# Patient Record
Sex: FEMALE | Race: WHITE | NOT HISPANIC OR LATINO | Employment: UNEMPLOYED | URBAN - METROPOLITAN AREA
[De-identification: names, ages, dates, MRNs, and addresses within clinical notes are randomized per-mention and may not be internally consistent; named-entity substitution may affect disease eponyms.]

---

## 2021-03-15 PROBLEM — A07.1 GIARDIASIS: Status: ACTIVE | Noted: 2021-03-15

## 2021-03-25 PROBLEM — F17.200 TOBACCO USE DISORDER: Status: ACTIVE | Noted: 2021-03-25

## 2021-07-16 PROBLEM — R01.1 SYSTOLIC EJECTION MURMUR: Status: ACTIVE | Noted: 2021-07-16

## 2021-07-16 PROBLEM — I10 ESSENTIAL HYPERTENSION: Status: ACTIVE | Noted: 2021-07-16

## 2021-07-16 PROBLEM — I51.7 LVH (LEFT VENTRICULAR HYPERTROPHY): Status: ACTIVE | Noted: 2021-07-16

## 2021-07-19 PROBLEM — I49.3 PVCS (PREMATURE VENTRICULAR CONTRACTIONS): Status: RESOLVED | Noted: 2021-07-19 | Resolved: 2021-07-19

## 2021-07-19 PROBLEM — I49.3 PVCS (PREMATURE VENTRICULAR CONTRACTIONS): Status: ACTIVE | Noted: 2021-07-19

## 2021-07-19 PROBLEM — E83.42 HYPOMAGNESEMIA: Status: ACTIVE | Noted: 2021-07-19

## 2021-07-19 PROBLEM — E87.6 HYPOKALEMIA: Status: RESOLVED | Noted: 2021-07-19 | Resolved: 2021-07-19

## 2021-07-19 PROBLEM — E87.6 HYPOKALEMIA: Status: ACTIVE | Noted: 2021-07-19

## 2021-07-19 PROBLEM — E83.42 HYPOMAGNESEMIA: Status: RESOLVED | Noted: 2021-07-19 | Resolved: 2021-07-19

## 2022-03-22 ENCOUNTER — OFFICE VISIT (OUTPATIENT)
Dept: URGENT CARE | Facility: CLINIC | Age: 62
End: 2022-03-22
Payer: COMMERCIAL

## 2022-03-22 ENCOUNTER — PHARMACY VISIT (OUTPATIENT)
Dept: PHARMACY | Facility: MEDICAL CENTER | Age: 62
End: 2022-03-22
Payer: COMMERCIAL

## 2022-03-22 VITALS
OXYGEN SATURATION: 96 % | WEIGHT: 117 LBS | BODY MASS INDEX: 22.97 KG/M2 | HEIGHT: 60 IN | TEMPERATURE: 98.1 F | RESPIRATION RATE: 16 BRPM | DIASTOLIC BLOOD PRESSURE: 94 MMHG | SYSTOLIC BLOOD PRESSURE: 130 MMHG | HEART RATE: 85 BPM

## 2022-03-22 DIAGNOSIS — M10.9 ACUTE GOUT INVOLVING TOE OF RIGHT FOOT, UNSPECIFIED CAUSE: ICD-10-CM

## 2022-03-22 PROCEDURE — 99203 OFFICE O/P NEW LOW 30 MIN: CPT | Performed by: STUDENT IN AN ORGANIZED HEALTH CARE EDUCATION/TRAINING PROGRAM

## 2022-03-22 PROCEDURE — RXMED WILLOW AMBULATORY MEDICATION CHARGE: Performed by: STUDENT IN AN ORGANIZED HEALTH CARE EDUCATION/TRAINING PROGRAM

## 2022-03-22 RX ORDER — INDOMETHACIN 50 MG/1
50 CAPSULE ORAL 2 TIMES DAILY PRN
Qty: 30 CAPSULE | Refills: 1 | Status: SHIPPED | OUTPATIENT
Start: 2022-03-22 | End: 2022-03-22

## 2022-03-22 RX ORDER — INDOMETHACIN 50 MG/1
50 CAPSULE ORAL 2 TIMES DAILY PRN
Qty: 30 CAPSULE | Refills: 1 | Status: SHIPPED | OUTPATIENT
Start: 2022-03-22

## 2022-03-22 ASSESSMENT — FIBROSIS 4 INDEX: FIB4 SCORE: 2.21

## 2022-03-22 NOTE — PROGRESS NOTES
Subjective:   CHIEF COMPLAINT  Chief Complaint   Patient presents with   • Gout     Flare up started last night         HPI  Elinor Waddell is a 62 y.o. female who presents with a chief complaint of an acute gout flare of her right big toe.  Patient is requesting a refill of her indomethacin which is the only medication that helps.  Symptoms started last night.    REVIEW OF SYSTEMS  General: no fever or chills  GI: no nausea or vomiting  See HPI for further details.    PAST MEDICAL HISTORY   has a past medical history of Anxiety, Anxiety and depression, Arrhythmia, Arthritis, Bowel habit changes, High cholesterol, Hypertension, IBD (inflammatory bowel disease), Rectal prolapse, and Systolic ejection murmur (2021).    SURGICAL HISTORY   has a past surgical history that includes colon resection; colonoscopy,diagnostic (3/15/2021); and rectopexy, robot-assisted, using da moises xi (N/A, 2021).    ALLERGIES  Allergies   Allergen Reactions   • Percocet [Apap-Fd&C Red #40 [place]-Oxycodone] Vomiting       CURRENT MEDICATIONS  Home Medications     Reviewed by Bobby Arrington Ass't (Medical Assistant) on 22 at 0825  Med List Status: <None>   Medication Last Dose Status   atorvastatin (LIPITOR) 40 MG Tab Taking Active   carvedilol (COREG) 3.125 MG Tab Taking Active   lisinopril (PRINIVIL) 10 MG Tab Taking Active   loperamide (IMODIUM) 1 MG/7.5ML Suspension PRN Active   Naloxone (NARCAN) 4 MG/0.1ML Liquid PRN Active                SOCIAL HISTORY  Social History     Tobacco Use   • Smoking status: Former Smoker     Years: 30.00     Types: Cigarettes     Quit date: 2021     Years since quittin.8   • Smokeless tobacco: Never Used   • Tobacco comment: pt has stopped   Vaping Use   • Vaping Use: Never used   Substance and Sexual Activity   • Alcohol use: Yes     Alcohol/week: 1.8 oz     Types: 3 Glasses of wine per week     Comment: occasionally    • Drug use: Not Currently     Comment: tried it in past  doesnt like   • Sexual activity: Not Currently     Partners: Male       FAMILY HISTORY  Family History   Problem Relation Age of Onset   • Heart Disease Father    • Stroke Father           Objective:   PHYSICAL EXAM  VITAL SIGNS: /94   Pulse 85   Temp 36.7 °C (98.1 °F) (Temporal)   Resp 16   Ht 1.524 m (5')   Wt 53.1 kg (117 lb)   SpO2 96%   BMI 22.85 kg/m²     Gen: no acute distress, normal voice  Psych: normal affect, normal judgement, alert, awake  Skin: dry, intact, moist mucosal membranes  Lungs: CTAB w/ symmetric expansion  CV: RRR w/o murmurs or clicks  MSK: Mild erythema and edema of the first MTPJ of right foot with localized TTP within the region    Assessment/Plan:     1. Acute gout involving toe of right foot, unspecified cause  indomethacin (INDOCIN) 50 MG Cap   Chronic issue with an acute exacerbation.  -Refilled indomethacin  -Counseled on the importance of diet  -Return to urgent care any new/worsening symptoms or further questions or concerns.  Patient understood everything discussed.  All questions were answered.      Differential diagnosis, natural history, supportive care, and indications for immediate follow-up discussed. All questions answered. Patient agrees with the plan of care.    Follow-up as needed if symptoms worsen or fail to improve to PCP, Urgent care or Emergency Room.    Please note that this dictation was created using voice recognition software. I have made a reasonable attempt to correct obvious errors, but I expect that there are errors of grammar and possibly content that I did not discover before finalizing the note.

## 2022-09-18 ENCOUNTER — HOSPITAL ENCOUNTER (EMERGENCY)
Facility: MEDICAL CENTER | Age: 62
End: 2022-09-19
Attending: EMERGENCY MEDICINE
Payer: MEDICAID

## 2022-09-18 DIAGNOSIS — F10.920 ALCOHOLIC INTOXICATION WITHOUT COMPLICATION (HCC): ICD-10-CM

## 2022-09-18 PROCEDURE — 99284 EMERGENCY DEPT VISIT MOD MDM: CPT

## 2022-09-19 ENCOUNTER — HOSPITAL ENCOUNTER (EMERGENCY)
Facility: MEDICAL CENTER | Age: 62
End: 2022-09-19
Attending: EMERGENCY MEDICINE
Payer: MEDICAID

## 2022-09-19 VITALS
RESPIRATION RATE: 18 BRPM | HEART RATE: 75 BPM | DIASTOLIC BLOOD PRESSURE: 77 MMHG | OXYGEN SATURATION: 95 % | TEMPERATURE: 97.7 F | HEIGHT: 62 IN | SYSTOLIC BLOOD PRESSURE: 125 MMHG | BODY MASS INDEX: 22.42 KG/M2

## 2022-09-19 VITALS
SYSTOLIC BLOOD PRESSURE: 130 MMHG | HEIGHT: 60 IN | OXYGEN SATURATION: 98 % | WEIGHT: 125.88 LBS | HEART RATE: 90 BPM | TEMPERATURE: 98 F | RESPIRATION RATE: 16 BRPM | DIASTOLIC BLOOD PRESSURE: 80 MMHG | BODY MASS INDEX: 24.71 KG/M2

## 2022-09-19 DIAGNOSIS — K62.89 ANAL PAIN: ICD-10-CM
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
## 2022-09-19 PROCEDURE — 99283 EMERGENCY DEPT VISIT LOW MDM: CPT

## 2022-09-19 ASSESSMENT — FIBROSIS 4 INDEX: FIB4 SCORE: 2.21
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
## 2022-09-19 NOTE — ED NOTES
Discharge teaching and paperwork provided and all questions/concerns answered. VSS, assessment stable. Patient discharged to the care of self and ambulated out of the ED.

## 2022-09-19 NOTE — ED NOTES
PT AMBULATORY TO BATHROOM WITH UNSTEADY GAIT. HEAVY 2 PERSON ASSIST. PT STILL INTOXICATED AT THIS TIME. PT ALERT TO PERSON PLACE
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
## 2022-09-19 NOTE — ED NOTES
PT OUTSIDE- HAS BEEN DC WITH BELONGINGS. CELL PHONE CIGARATTES DC PAPERS KEYS ECT. PT EDUCATED ON TAXI NUMBER.
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
